# Patient Record
Sex: FEMALE | Race: WHITE | NOT HISPANIC OR LATINO
[De-identification: names, ages, dates, MRNs, and addresses within clinical notes are randomized per-mention and may not be internally consistent; named-entity substitution may affect disease eponyms.]

---

## 2021-07-02 PROBLEM — Z00.00 ENCOUNTER FOR PREVENTIVE HEALTH EXAMINATION: Status: ACTIVE | Noted: 2021-07-02

## 2021-07-07 ENCOUNTER — APPOINTMENT (OUTPATIENT)
Dept: PULMONOLOGY | Facility: CLINIC | Age: 21
End: 2021-07-07
Payer: COMMERCIAL

## 2021-07-07 VITALS
RESPIRATION RATE: 16 BRPM | HEIGHT: 62.5 IN | WEIGHT: 113.25 LBS | BODY MASS INDEX: 20.32 KG/M2 | OXYGEN SATURATION: 99 % | SYSTOLIC BLOOD PRESSURE: 96 MMHG | TEMPERATURE: 97.5 F | HEART RATE: 75 BPM | DIASTOLIC BLOOD PRESSURE: 63 MMHG

## 2021-07-07 DIAGNOSIS — G47.11 IDIOPATHIC HYPERSOMNIA WITH LONG SLEEP TIME: ICD-10-CM

## 2021-07-07 DIAGNOSIS — F41.9 ANXIETY DISORDER, UNSPECIFIED: ICD-10-CM

## 2021-07-07 DIAGNOSIS — F32.9 ANXIETY DISORDER, UNSPECIFIED: ICD-10-CM

## 2021-07-07 DIAGNOSIS — E28.2 POLYCYSTIC OVARIAN SYNDROME: ICD-10-CM

## 2021-07-07 DIAGNOSIS — G47.419 NARCOLEPSY W/OUT CATAPLEXY: ICD-10-CM

## 2021-07-07 PROCEDURE — 99204 OFFICE O/P NEW MOD 45 MIN: CPT | Mod: GC

## 2021-07-07 PROCEDURE — 99072 ADDL SUPL MATRL&STAF TM PHE: CPT

## 2021-07-07 NOTE — HISTORY OF PRESENT ILLNESS
[FreeTextEntry1] : 20 yo F with Anxiety/ depression, PCOS, ADHD, and Narcolepsy presenting for a second opinion about her narcolepsy management. Patient was diagnosed w naroclepsy  in 2018 via  MSLT , which was done as part of her workup for EDS, fatigue, and irregular sleep habits. PSG reportedly done previously  showed no apneic events, no desats, but +PLM - data not available for review. MSLT showed sleep onset latency of [id number] and 3 REM onset. 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 She has since then been tried on nuvigil, soriamfetol, and pitolisant, but had palpitations and discontinued each medication after a few days. She had been prescribed Ambien CR 12.5mg and Adderall 20mg daily years ago (latter for adhd), which she still takes. Notably she is also on fluoxetine (was on zoloft before), which she had been taking for years as well (unclear as to whether she took it at the time of her PSG/MSLT). she takes fluoxetine for anxiety/depression.  she takes this prior to bedtime. \par Her main issue is difficulty initating sleep, maintaining sleep, and excessive daytime somnolence. She has been having abnormal sleep since her teenage years, and is not aware of any events (including head trauma or flu episode) that may have triggered it. She usually tries to sleep at 12AM with normal sleep onset time if she takes her ambien (>30m if not). She wakes up 2-3 times every night and if it occurs earlier in the night, is able to fall back asleep. She takes naps frequently (30min duration) but both her overnight sleep and naps are nonrestorative. she reports some nights with prolonged sleep latency and typically sleeps from 12 mn - 6am or until 11 am on some nights. She has witnessed jerking movements during sleep but does not endorse any abnormal sensations in her legs before sleeping, no hypnagogic or hyponomic hallucinations, and no evidence of sleep paralysis. She has no snoring\par She has no caffeine intake. Uses marijuana daily but no other smoking or illicit drug use.\par She has no history consistent with cataplexy episodes, and has never had anything analogous to it. \par ESS is 18 today. she reports unintentional sleep episodes during periods of relative inactivity and activities; reports drowsiness at school. [Daytime Somnolence] : daytime somnolence [ESS] : 18

## 2021-07-07 NOTE — ASSESSMENT
[FreeTextEntry1] : 20 yo F with Anxiety/ depression, PCOS, ADHD, and Narcolepsy presenting for a second opinion about her narcolepsy management.\par \par She reports no episode of cataplexy like episodes. While her REM onset time is reduced on her 2018 MSLT, her sleep onset time is still above 8 minutes. Moreover, she may have been on an SSRI (amongst other medications including Adderall and ambien) which may have confounded the results of her sleep studies. it is not clear if she has a psg the night before the mslt - pt does not recall and data is not available. pt does not have clinical features suggestive of NT1 as there is no complaint c/w cataplexy; the differential dx includes narcolepsy  type 2, idiopathic hypersomnia, eds related to fragmented sleep.  \par To ascertain the proper sleep diagnosis, to determine if there has been any development of the underlying sleep disorder over time we will perform current PSG and MSLT. However, this must be done after weaning off the SSRI over a period of 4-6 weeks (to be reviewed w/ pts psychiatrist) as the ssri will confound MSLT finding. Likewise she will need to taper of the adderall as this will also confound the mslt.  Since she is from [place] and goes to college in [place], and is leaving on a trip shortly, as such testing may be delayed due to logistical difficulties to her next break from college. But rather than pursuing more treatment for narcolepsy, ensuring the diagnosis with current psg/mslt with minimal confounding factors is necessary. If narcolepsy is the case, may re-challenge wakix or other medications for narcolepsy  with gradual uptitration of doses to minimize adverse effects. she does not want to try xyrem or xywav because she feels it is not compatible with her lifestyle in college. \par \par She is to switch her fluoxetine dose timing from qhs to AM, to minimize potential alerting effects that may contirbute to difficulty initiating and miantaining sleep. All other meds including zolpidem cr which she has been on for many years, are to stay the same. She prefers to come off the Ambien, but this will have to be done gradually at a later time. \par \par Sleep logs were given to her for further review of her daily sleep pattern. Counseling on sleep hygiene and dangers of driving have also been provided. \par \par she will f/u as to her decision as to when she may want to pursue psg / mslt studies. \par \par The dangers of drowsy driving were discussed with the patient.  The patient was warned to avoid drowsy driving.

## 2021-07-07 NOTE — PHYSICAL EXAM
[General Appearance - Well Nourished] : well nourished [General Appearance - Well Developed] : well developed [Normal Conjunctiva] : the conjunctiva exhibited no abnormalities [Normal Oropharynx] : normal oropharynx [Neck Appearance] : the appearance of the neck was normal [Abnormal Walk] : normal gait [Nail Clubbing] : no clubbing of the fingernails [Cyanosis, Localized] : no localized cyanosis [Skin Color & Pigmentation] : normal skin color and pigmentation [] : no rash [Oriented To Time, Place, And Person] : oriented to person, place, and time [Impaired Insight] : insight and judgment were intact [Affect] : the affect was normal [Mood] : the mood was normal

## 2022-07-19 PROBLEM — G47.419 NARCOLEPSY WITHOUT CATAPLEXY: Status: ACTIVE | Noted: 2021-07-07
